# Patient Record
Sex: FEMALE | Race: WHITE
[De-identification: names, ages, dates, MRNs, and addresses within clinical notes are randomized per-mention and may not be internally consistent; named-entity substitution may affect disease eponyms.]

---

## 2018-04-10 PROBLEM — Z00.00 ENCOUNTER FOR PREVENTIVE HEALTH EXAMINATION: Status: ACTIVE | Noted: 2018-04-10

## 2018-04-16 ENCOUNTER — APPOINTMENT (OUTPATIENT)
Dept: PEDIATRIC SURGERY | Facility: CLINIC | Age: 28
End: 2018-04-16
Payer: COMMERCIAL

## 2018-04-16 DIAGNOSIS — Q89.7 MULTIPLE CONGENITAL MALFORMATIONS, NOT ELSEWHERE CLASSIFIED: ICD-10-CM

## 2018-04-16 PROCEDURE — 99243 OFF/OP CNSLTJ NEW/EST LOW 30: CPT

## 2018-05-11 ENCOUNTER — INPATIENT (INPATIENT)
Facility: HOSPITAL | Age: 28
LOS: 4 days | Discharge: HOME | End: 2018-05-16
Attending: OBSTETRICS & GYNECOLOGY | Admitting: OBSTETRICS & GYNECOLOGY
Payer: COMMERCIAL

## 2018-05-11 VITALS
DIASTOLIC BLOOD PRESSURE: 88 MMHG | TEMPERATURE: 98 F | OXYGEN SATURATION: 100 % | SYSTOLIC BLOOD PRESSURE: 128 MMHG | HEART RATE: 88 BPM | WEIGHT: 141.98 LBS | HEIGHT: 63 IN

## 2018-05-11 DIAGNOSIS — Z3A.00 WEEKS OF GESTATION OF PREGNANCY NOT SPECIFIED: ICD-10-CM

## 2018-05-11 DIAGNOSIS — O26.899 OTHER SPECIFIED PREGNANCY RELATED CONDITIONS, UNSPECIFIED TRIMESTER: ICD-10-CM

## 2018-05-11 LAB
ALBUMIN SERPL ELPH-MCNC: 3.7 G/DL — SIGNIFICANT CHANGE UP (ref 3.3–5)
ALP SERPL-CCNC: 168 U/L — HIGH (ref 40–120)
ALT FLD-CCNC: 18 U/L — SIGNIFICANT CHANGE UP (ref 10–45)
ANION GAP SERPL CALC-SCNC: 15 MMOL/L — SIGNIFICANT CHANGE UP (ref 5–17)
APPEARANCE UR: CLEAR — SIGNIFICANT CHANGE UP
AST SERPL-CCNC: 17 U/L — SIGNIFICANT CHANGE UP (ref 10–40)
BASOPHILS NFR BLD AUTO: 0.1 % — SIGNIFICANT CHANGE UP (ref 0–2)
BILIRUB SERPL-MCNC: 1 MG/DL — SIGNIFICANT CHANGE UP (ref 0.2–1.2)
BILIRUB UR-MCNC: NEGATIVE — SIGNIFICANT CHANGE UP
BLD GP AB SCN SERPL QL: POSITIVE — SIGNIFICANT CHANGE UP
BUN SERPL-MCNC: 7 MG/DL — SIGNIFICANT CHANGE UP (ref 7–23)
CALCIUM SERPL-MCNC: 9.7 MG/DL — SIGNIFICANT CHANGE UP (ref 8.4–10.5)
CHLORIDE SERPL-SCNC: 103 MMOL/L — SIGNIFICANT CHANGE UP (ref 96–108)
CO2 SERPL-SCNC: 20 MMOL/L — LOW (ref 22–31)
COLOR SPEC: YELLOW — SIGNIFICANT CHANGE UP
CREAT SERPL-MCNC: 0.4 MG/DL — LOW (ref 0.5–1.3)
DIFF PNL FLD: (no result)
EOSINOPHIL NFR BLD AUTO: 0.4 % — SIGNIFICANT CHANGE UP (ref 0–6)
GLUCOSE SERPL-MCNC: 85 MG/DL — SIGNIFICANT CHANGE UP (ref 70–99)
GLUCOSE UR QL: NEGATIVE — SIGNIFICANT CHANGE UP
HCT VFR BLD CALC: 36.7 % — SIGNIFICANT CHANGE UP (ref 34.5–45)
HGB BLD-MCNC: 12.2 G/DL — SIGNIFICANT CHANGE UP (ref 11.5–15.5)
KETONES UR-MCNC: NEGATIVE — SIGNIFICANT CHANGE UP
LDH SERPL L TO P-CCNC: 206 U/L — SIGNIFICANT CHANGE UP (ref 50–242)
LEUKOCYTE ESTERASE UR-ACNC: NEGATIVE — SIGNIFICANT CHANGE UP
LYMPHOCYTES # BLD AUTO: 12.1 % — LOW (ref 13–44)
MCHC RBC-ENTMCNC: 29.7 PG — SIGNIFICANT CHANGE UP (ref 27–34)
MCHC RBC-ENTMCNC: 33.2 G/DL — SIGNIFICANT CHANGE UP (ref 32–36)
MCV RBC AUTO: 89.3 FL — SIGNIFICANT CHANGE UP (ref 80–100)
MONOCYTES NFR BLD AUTO: 5.4 % — SIGNIFICANT CHANGE UP (ref 2–14)
NEUTROPHILS NFR BLD AUTO: 82 % — HIGH (ref 43–77)
NITRITE UR-MCNC: NEGATIVE — SIGNIFICANT CHANGE UP
PH UR: 6 — SIGNIFICANT CHANGE UP (ref 5–8)
PLATELET # BLD AUTO: 143 K/UL — LOW (ref 150–400)
POTASSIUM SERPL-MCNC: 3.8 MMOL/L — SIGNIFICANT CHANGE UP (ref 3.5–5.3)
POTASSIUM SERPL-SCNC: 3.8 MMOL/L — SIGNIFICANT CHANGE UP (ref 3.5–5.3)
PROT SERPL-MCNC: 6.9 G/DL — SIGNIFICANT CHANGE UP (ref 6–8.3)
PROT UR-MCNC: NEGATIVE MG/DL — SIGNIFICANT CHANGE UP
RBC # BLD: 4.11 M/UL — SIGNIFICANT CHANGE UP (ref 3.8–5.2)
RBC # FLD: 14.1 % — SIGNIFICANT CHANGE UP (ref 10.3–16.9)
RH IG SCN BLD-IMP: NEGATIVE — SIGNIFICANT CHANGE UP
RH IG SCN BLD-IMP: NEGATIVE — SIGNIFICANT CHANGE UP
SODIUM SERPL-SCNC: 138 MMOL/L — SIGNIFICANT CHANGE UP (ref 135–145)
SP GR SPEC: <=1.005 — SIGNIFICANT CHANGE UP (ref 1–1.03)
URATE SERPL-MCNC: 3.3 MG/DL — SIGNIFICANT CHANGE UP (ref 2.5–7)
UROBILINOGEN FLD QL: 0.2 E.U./DL — SIGNIFICANT CHANGE UP
WBC # BLD: 13.9 K/UL — HIGH (ref 3.8–10.5)
WBC # FLD AUTO: 13.9 K/UL — HIGH (ref 3.8–10.5)

## 2018-05-11 PROCEDURE — 86077 PHYS BLOOD BANK SERV XMATCH: CPT

## 2018-05-11 RX ORDER — CITRIC ACID/SODIUM CITRATE 300-500 MG
15 SOLUTION, ORAL ORAL EVERY 4 HOURS
Qty: 0 | Refills: 0 | Status: DISCONTINUED | OUTPATIENT
Start: 2018-05-11 | End: 2018-05-12

## 2018-05-11 RX ORDER — OXYTOCIN 10 UNIT/ML
333.33 VIAL (ML) INJECTION
Qty: 20 | Refills: 0 | Status: DISCONTINUED | OUTPATIENT
Start: 2018-05-11 | End: 2018-05-12

## 2018-05-11 RX ORDER — SODIUM CHLORIDE 9 MG/ML
1000 INJECTION, SOLUTION INTRAVENOUS ONCE
Qty: 0 | Refills: 0 | Status: COMPLETED | OUTPATIENT
Start: 2018-05-11 | End: 2018-05-11

## 2018-05-11 RX ORDER — SODIUM CHLORIDE 9 MG/ML
1000 INJECTION, SOLUTION INTRAVENOUS
Qty: 0 | Refills: 0 | Status: DISCONTINUED | OUTPATIENT
Start: 2018-05-11 | End: 2018-05-12

## 2018-05-11 RX ADMIN — SODIUM CHLORIDE 2000 MILLILITER(S): 9 INJECTION, SOLUTION INTRAVENOUS at 19:54

## 2018-05-12 ENCOUNTER — RESULT REVIEW (OUTPATIENT)
Age: 28
End: 2018-05-12

## 2018-05-12 ENCOUNTER — TRANSCRIPTION ENCOUNTER (OUTPATIENT)
Age: 28
End: 2018-05-12

## 2018-05-12 LAB
BLD GP AB SCN SERPL QL: NEGATIVE — SIGNIFICANT CHANGE UP
RH IG SCN BLD-IMP: NEGATIVE — SIGNIFICANT CHANGE UP
T PALLIDUM AB TITR SER: NEGATIVE — SIGNIFICANT CHANGE UP

## 2018-05-12 RX ORDER — METOCLOPRAMIDE HCL 10 MG
10 TABLET ORAL ONCE
Qty: 0 | Refills: 0 | Status: DISCONTINUED | OUTPATIENT
Start: 2018-05-12 | End: 2018-05-12

## 2018-05-12 RX ORDER — CITRIC ACID/SODIUM CITRATE 300-500 MG
30 SOLUTION, ORAL ORAL ONCE
Qty: 0 | Refills: 0 | Status: COMPLETED | OUTPATIENT
Start: 2018-05-12 | End: 2018-05-12

## 2018-05-12 RX ORDER — SIMETHICONE 80 MG/1
80 TABLET, CHEWABLE ORAL EVERY 4 HOURS
Qty: 0 | Refills: 0 | Status: DISCONTINUED | OUTPATIENT
Start: 2018-05-12 | End: 2018-05-16

## 2018-05-12 RX ORDER — IBUPROFEN 200 MG
600 TABLET ORAL EVERY 6 HOURS
Qty: 0 | Refills: 0 | Status: COMPLETED | OUTPATIENT
Start: 2018-05-12 | End: 2018-05-13

## 2018-05-12 RX ORDER — OXYTOCIN 10 UNIT/ML
333.33 VIAL (ML) INJECTION
Qty: 20 | Refills: 0 | Status: DISCONTINUED | OUTPATIENT
Start: 2018-05-12 | End: 2018-05-16

## 2018-05-12 RX ORDER — CEFAZOLIN SODIUM 1 G
2000 VIAL (EA) INJECTION ONCE
Qty: 0 | Refills: 0 | Status: DISCONTINUED | OUTPATIENT
Start: 2018-05-12 | End: 2018-05-12

## 2018-05-12 RX ORDER — HEPARIN SODIUM 5000 [USP'U]/ML
5000 INJECTION INTRAVENOUS; SUBCUTANEOUS EVERY 12 HOURS
Qty: 0 | Refills: 0 | Status: CANCELLED | OUTPATIENT
Start: 2018-05-13 | End: 2018-05-12

## 2018-05-12 RX ORDER — OXYCODONE AND ACETAMINOPHEN 5; 325 MG/1; MG/1
1 TABLET ORAL
Qty: 0 | Refills: 0 | Status: DISCONTINUED | OUTPATIENT
Start: 2018-05-12 | End: 2018-05-16

## 2018-05-12 RX ORDER — NALOXONE HYDROCHLORIDE 4 MG/.1ML
0.1 SPRAY NASAL
Qty: 0 | Refills: 0 | Status: DISCONTINUED | OUTPATIENT
Start: 2018-05-12 | End: 2018-05-12

## 2018-05-12 RX ORDER — ONDANSETRON 8 MG/1
4 TABLET, FILM COATED ORAL EVERY 6 HOURS
Qty: 0 | Refills: 0 | Status: DISCONTINUED | OUTPATIENT
Start: 2018-05-12 | End: 2018-05-16

## 2018-05-12 RX ORDER — OXYTOCIN 10 UNIT/ML
41.67 VIAL (ML) INJECTION
Qty: 20 | Refills: 0 | Status: DISCONTINUED | OUTPATIENT
Start: 2018-05-12 | End: 2018-05-12

## 2018-05-12 RX ORDER — SODIUM CHLORIDE 9 MG/ML
1000 INJECTION, SOLUTION INTRAVENOUS
Qty: 0 | Refills: 0 | Status: DISCONTINUED | OUTPATIENT
Start: 2018-05-12 | End: 2018-05-16

## 2018-05-12 RX ORDER — LANOLIN
1 OINTMENT (GRAM) TOPICAL
Qty: 0 | Refills: 0 | Status: DISCONTINUED | OUTPATIENT
Start: 2018-05-12 | End: 2018-05-16

## 2018-05-12 RX ORDER — DOCUSATE SODIUM 100 MG
100 CAPSULE ORAL
Qty: 0 | Refills: 0 | Status: DISCONTINUED | OUTPATIENT
Start: 2018-05-12 | End: 2018-05-16

## 2018-05-12 RX ORDER — DIPHENHYDRAMINE HCL 50 MG
25 CAPSULE ORAL EVERY 6 HOURS
Qty: 0 | Refills: 0 | Status: DISCONTINUED | OUTPATIENT
Start: 2018-05-12 | End: 2018-05-16

## 2018-05-12 RX ORDER — IBUPROFEN 200 MG
600 TABLET ORAL EVERY 6 HOURS
Qty: 0 | Refills: 0 | Status: DISCONTINUED | OUTPATIENT
Start: 2018-05-12 | End: 2018-05-13

## 2018-05-12 RX ORDER — OXYCODONE AND ACETAMINOPHEN 5; 325 MG/1; MG/1
2 TABLET ORAL EVERY 6 HOURS
Qty: 0 | Refills: 0 | Status: DISCONTINUED | OUTPATIENT
Start: 2018-05-12 | End: 2018-05-16

## 2018-05-12 RX ORDER — AZITHROMYCIN 500 MG/1
500 TABLET, FILM COATED ORAL ONCE
Qty: 0 | Refills: 0 | Status: COMPLETED | OUTPATIENT
Start: 2018-05-12 | End: 2018-05-12

## 2018-05-12 RX ORDER — GLYCERIN ADULT
1 SUPPOSITORY, RECTAL RECTAL AT BEDTIME
Qty: 0 | Refills: 0 | Status: DISCONTINUED | OUTPATIENT
Start: 2018-05-12 | End: 2018-05-16

## 2018-05-12 RX ORDER — SODIUM CHLORIDE 9 MG/ML
1000 INJECTION, SOLUTION INTRAVENOUS ONCE
Qty: 0 | Refills: 0 | Status: DISCONTINUED | OUTPATIENT
Start: 2018-05-12 | End: 2018-05-12

## 2018-05-12 RX ORDER — FERROUS SULFATE 325(65) MG
325 TABLET ORAL DAILY
Qty: 0 | Refills: 0 | Status: DISCONTINUED | OUTPATIENT
Start: 2018-05-12 | End: 2018-05-16

## 2018-05-12 RX ORDER — NALOXONE HYDROCHLORIDE 4 MG/.1ML
0.1 SPRAY NASAL
Qty: 0 | Refills: 0 | Status: DISCONTINUED | OUTPATIENT
Start: 2018-05-12 | End: 2018-05-16

## 2018-05-12 RX ORDER — OXYTOCIN 10 UNIT/ML
41.67 VIAL (ML) INJECTION
Qty: 20 | Refills: 0 | Status: DISCONTINUED | OUTPATIENT
Start: 2018-05-12 | End: 2018-05-16

## 2018-05-12 RX ORDER — ONDANSETRON 8 MG/1
4 TABLET, FILM COATED ORAL EVERY 6 HOURS
Qty: 0 | Refills: 0 | Status: DISCONTINUED | OUTPATIENT
Start: 2018-05-12 | End: 2018-05-12

## 2018-05-12 RX ORDER — ACETAMINOPHEN 500 MG
650 TABLET ORAL EVERY 6 HOURS
Qty: 0 | Refills: 0 | Status: DISCONTINUED | OUTPATIENT
Start: 2018-05-12 | End: 2018-05-16

## 2018-05-12 RX ORDER — TETANUS TOXOID, REDUCED DIPHTHERIA TOXOID AND ACELLULAR PERTUSSIS VACCINE, ADSORBED 5; 2.5; 8; 8; 2.5 [IU]/.5ML; [IU]/.5ML; UG/.5ML; UG/.5ML; UG/.5ML
0.5 SUSPENSION INTRAMUSCULAR ONCE
Qty: 0 | Refills: 0 | Status: DISCONTINUED | OUTPATIENT
Start: 2018-05-12 | End: 2018-05-16

## 2018-05-12 RX ORDER — MORPHINE SULFATE 50 MG/1
4 CAPSULE, EXTENDED RELEASE ORAL ONCE
Qty: 0 | Refills: 0 | Status: DISCONTINUED | OUTPATIENT
Start: 2018-05-12 | End: 2018-05-16

## 2018-05-12 RX ORDER — OXYTOCIN 10 UNIT/ML
2 VIAL (ML) INJECTION
Qty: 30 | Refills: 0 | Status: DISCONTINUED | OUTPATIENT
Start: 2018-05-12 | End: 2018-05-12

## 2018-05-12 RX ORDER — SODIUM CHLORIDE 9 MG/ML
1000 INJECTION, SOLUTION INTRAVENOUS
Qty: 0 | Refills: 0 | Status: DISCONTINUED | OUTPATIENT
Start: 2018-05-12 | End: 2018-05-12

## 2018-05-12 RX ADMIN — Medication 2 MILLIUNIT(S)/MIN: at 07:23

## 2018-05-12 RX ADMIN — SODIUM CHLORIDE 125 MILLILITER(S): 9 INJECTION, SOLUTION INTRAVENOUS at 04:25

## 2018-05-12 RX ADMIN — AZITHROMYCIN 255 MILLIGRAM(S): 500 TABLET, FILM COATED ORAL at 15:05

## 2018-05-12 RX ADMIN — Medication 600 MILLIGRAM(S): at 20:13

## 2018-05-12 RX ADMIN — Medication 600 MILLIGRAM(S): at 21:00

## 2018-05-12 RX ADMIN — Medication 100 MILLIGRAM(S): at 14:53

## 2018-05-12 RX ADMIN — Medication 125 MILLIUNIT(S)/MIN: at 17:07

## 2018-05-12 RX ADMIN — Medication 30 MILLILITER(S): at 14:55

## 2018-05-12 NOTE — DISCHARGE NOTE OB - MEDICATION SUMMARY - MEDICATIONS TO TAKE
I will START or STAY ON the medications listed below when I get home from the hospital:    ibuprofen 100 mg/5 mL oral suspension  -- 30 milliliter(s) by mouth every 6 hours, As needed, mod pain  -- Indication: For Pain    acetaminophen 325 mg oral tablet  -- 2 tab(s) by mouth every 6 hours, As needed, For Temp greater than 38.5 C (101.3 F)  -- Indication: For Fever    Prenatal Multivitamins with Folic Acid 1 mg oral tablet  -- 1 tab(s) by mouth once a day  -- Indication: For Postpartum state    docusate sodium 100 mg oral capsule  -- 1 cap(s) by mouth 2 times a day, As needed, Stool Softening  -- Indication: For Constipation

## 2018-05-12 NOTE — DISCHARGE NOTE OB - CARE PLAN
Principal Discharge DX:	Postpartum state  Goal:	discharge home  Assessment and plan of treatment:	Patient to be discharged home. nothing per vagina, no tampons, tub baths, intercourse. Patient to notify provider for fever >101, heavy vaginal bleeding, extreme abdominal pain. patient to follow up in office in 2 weeks.

## 2018-05-12 NOTE — DISCHARGE NOTE OB - CARE PROVIDER_API CALL
Darby Machuca (DO; MS), Obstetrics and Gynecology  [address] [place] [address]  Phone: [phone]  Fax: [phone]

## 2018-05-12 NOTE — DISCHARGE NOTE OB - PATIENT PORTAL LINK FT
You can access the [facility] Patient Portal, offered by [facility], by registering with the following website: [email or web address]

## 2018-05-12 NOTE — DISCHARGE NOTE OB - PLAN OF CARE
discharge home Patient to be discharged home. nothing per vagina, no tampons, tub baths, intercourse. Patient to notify provider for fever >101, heavy vaginal bleeding, extreme abdominal pain. patient to follow up in office in 2 weeks.

## 2018-05-13 LAB
BASOPHILS NFR BLD AUTO: 0.1 % — SIGNIFICANT CHANGE UP (ref 0–2)
EOSINOPHIL NFR BLD AUTO: 0.1 % — SIGNIFICANT CHANGE UP (ref 0–6)
HCT VFR BLD CALC: 30.8 % — LOW (ref 34.5–45)
HGB BLD-MCNC: 10.2 G/DL — LOW (ref 11.5–15.5)
LYMPHOCYTES # BLD AUTO: 7.8 % — LOW (ref 13–44)
MCHC RBC-ENTMCNC: 29.5 PG — SIGNIFICANT CHANGE UP (ref 27–34)
MCHC RBC-ENTMCNC: 33.1 G/DL — SIGNIFICANT CHANGE UP (ref 32–36)
MCV RBC AUTO: 89 FL — SIGNIFICANT CHANGE UP (ref 80–100)
MONOCYTES NFR BLD AUTO: 5.1 % — SIGNIFICANT CHANGE UP (ref 2–14)
NEUTROPHILS NFR BLD AUTO: 86.9 % — HIGH (ref 43–77)
PLATELET # BLD AUTO: 130 K/UL — LOW (ref 150–400)
RBC # BLD: 3.46 M/UL — LOW (ref 3.8–5.2)
RBC # FLD: 14.5 % — SIGNIFICANT CHANGE UP (ref 10.3–16.9)
WBC # BLD: 19.3 K/UL — HIGH (ref 3.8–10.5)
WBC # FLD AUTO: 19.3 K/UL — HIGH (ref 3.8–10.5)

## 2018-05-13 RX ORDER — HEPARIN SODIUM 5000 [USP'U]/ML
5000 INJECTION INTRAVENOUS; SUBCUTANEOUS EVERY 12 HOURS
Qty: 0 | Refills: 0 | Status: DISCONTINUED | OUTPATIENT
Start: 2018-05-13 | End: 2018-05-16

## 2018-05-13 RX ORDER — IBUPROFEN 200 MG
600 TABLET ORAL EVERY 6 HOURS
Qty: 0 | Refills: 0 | Status: DISCONTINUED | OUTPATIENT
Start: 2018-05-13 | End: 2018-05-16

## 2018-05-13 RX ADMIN — Medication 600 MILLIGRAM(S): at 19:39

## 2018-05-13 RX ADMIN — OXYCODONE AND ACETAMINOPHEN 2 TABLET(S): 5; 325 TABLET ORAL at 12:38

## 2018-05-13 RX ADMIN — OXYCODONE AND ACETAMINOPHEN 2 TABLET(S): 5; 325 TABLET ORAL at 12:56

## 2018-05-13 RX ADMIN — Medication 600 MILLIGRAM(S): at 20:15

## 2018-05-13 RX ADMIN — SIMETHICONE 80 MILLIGRAM(S): 80 TABLET, CHEWABLE ORAL at 21:17

## 2018-05-13 RX ADMIN — Medication 600 MILLIGRAM(S): at 01:47

## 2018-05-13 RX ADMIN — HEPARIN SODIUM 5000 UNIT(S): 5000 INJECTION INTRAVENOUS; SUBCUTANEOUS at 07:09

## 2018-05-13 RX ADMIN — Medication 600 MILLIGRAM(S): at 07:09

## 2018-05-13 RX ADMIN — SIMETHICONE 80 MILLIGRAM(S): 80 TABLET, CHEWABLE ORAL at 13:30

## 2018-05-13 RX ADMIN — HEPARIN SODIUM 5000 UNIT(S): 5000 INJECTION INTRAVENOUS; SUBCUTANEOUS at 18:33

## 2018-05-13 RX ADMIN — Medication 600 MILLIGRAM(S): at 07:58

## 2018-05-13 RX ADMIN — OXYCODONE AND ACETAMINOPHEN 2 TABLET(S): 5; 325 TABLET ORAL at 18:32

## 2018-05-13 RX ADMIN — Medication 600 MILLIGRAM(S): at 02:31

## 2018-05-13 RX ADMIN — OXYCODONE AND ACETAMINOPHEN 2 TABLET(S): 5; 325 TABLET ORAL at 19:00

## 2018-05-13 RX ADMIN — Medication 600 MILLIGRAM(S): at 14:00

## 2018-05-13 RX ADMIN — Medication 600 MILLIGRAM(S): at 13:43

## 2018-05-13 RX ADMIN — SODIUM CHLORIDE 125 MILLILITER(S): 9 INJECTION, SOLUTION INTRAVENOUS at 02:25

## 2018-05-13 RX ADMIN — SIMETHICONE 80 MILLIGRAM(S): 80 TABLET, CHEWABLE ORAL at 01:48

## 2018-05-13 NOTE — PROGRESS NOTE ADULT - ASSESSMENT
A/P 27y  s/p c/s, POD #1  ,stable  - f.u AM CBC  - TOV  Diet: Reg with flatus  Pain: OPM  IV fluid: Saline lock  OOB/SCDs/IS  Continue to monitor  Anticipate discharge POD #3 or #4

## 2018-05-13 NOTE — PROGRESS NOTE ADULT - ATTENDING COMMENTS
Patient seen and examined at bedside. patient has no current complaints. Overall feels sore, but motrin is adequate for pain control. Patient is trying to breastfeed but nipples are cracking so patient is taking a break. Has not yet passed flatus. Herman to be removed. Hemodynamically stable.

## 2018-05-13 NOTE — PROGRESS NOTE ADULT - SUBJECTIVE AND OBJECTIVE BOX
Patient evaluated at bedside.   She reports pain is well controlled with Motrin suspension  She denies headache, dizziness, chest pain, palpitations, shortness of breathe, nausea, vomiting or heavy vaginal bleeding.  She has been in bed since delivery, has Herman in situ and denies any flatus. She is tolerating clear diet and is breastfeeding.    Physical Exam:  Vital Signs Last 24 Hrs  T(C): 36.8 (13 May 2018 06:15), Max: 37.4 (12 May 2018 18:30)  T(F): 98.2 (13 May 2018 06:15), Max: 99.3 (12 May 2018 18:30)  HR: 73 (13 May 2018 06:15) (67 - 106)  BP: 110/62 (13 May 2018 06:15) (97/64 - 137/62)  BP(mean): --  RR: 18 (13 May 2018 06:15) (17 - 24)  SpO2: 98% (13 May 2018 06:15) (97% - 99%)    GA: NAD, A+0 x 3  CV: RRR  Pulm: CTAB  Breasts: soft, nontender, no palpable masses  Abd: hypo active BS, soft, nontender, mild distended, no rebound or guarding, incision clean, dry and intact, uterus firm at midline,  2 fb below umbilicus  : lochia WNL  Herman: clear urine  Extremities: no swelling or calf tenderness, reflexes +2 bilaterally                            12.2   13.9  )-----------( 143      ( 11 May 2018 18:40 )             36.7     05-11    138  |  103  |  7   ----------------------------<  85  3.8   |  20<L>  |  0.40<L>    Ca    9.7      11 May 2018 18:40    TPro  6.9  /  Alb  3.7  /  TBili  1.0  /  DBili  x   /  AST  17  /  ALT  18  /  AlkPhos  168<H>  05-11

## 2018-05-13 NOTE — LACTATION INITIAL EVALUATION - NS LACT CON REASON FOR REQ
Pt. is a P1 at 40.4 wks. gestation via .  Pt. has sore, red and cracked nipples at this time and unable to have baby at the breast.  Pt.  baby during/c/o sore, painful nipples/nipple damage/primaparous mom c/o sore, painful nipples/nipple damage/primaparous mom/Pt. is a P1 at 40.4 wks. gestation via .  Pt. has sore, red and cracked nipples at this time and unable to have baby at the breast.  Pt. last  baby  0500  but was in to much discomfort to continue breastfeeding.  Pt. has not  ,hand expressed or pumped during the day to allow  pt a "break".   While palpating breasts, pt. shows discomfort the closer I touch areola and nipple.  Attempted to do hand expression pt. tolerated briefly but then had to stop due to discomfort.  Colostrum noted while doing hand expression.  Discussed use of pump and pt. agreeable. Positioned flanges on pt.' breasts and instructed pt. on use of pump and informed pt. she can adjust the suction as need be.  Pt. able to tolerate pumping session and states she will try  to continue with pumping every three hours.  Baby was fed supplemental formula 10ml. by father.  Discussed feeding plan with pt. and states  understands to pump and if able to save EBM to be fed at next feeding session.  Discussed with pt. goal is to have baby return to breastfeeding. Pt. apprehensive but hopeful  she can return to breastfeeding.  Not known if baby has possible tongue tie or if it was a shallow latch that damaged the nipples. nipple damage/c/o sore, painful nipples/primaparous mom/Pt. is a P1 at 40.4 wks. gestation via .  Pt. has sore, red and cracked nipples at this time and unable to have baby at the breast.  Pt. last  baby  0500  but was in to much discomfort to continue breastfeeding.  Pt. has not  ,hand expressed or pumped during the day to allow  pt a "break".   While palpating breasts, pt. shows discomfort the closer I touch areola and nipple.  Attempted to do hand expression pt. tolerated briefly but then had to stop due to discomfort.  Colostrum noted while doing hand expression.  Discussed use of pump and pt. agreeable. Positioned flanges on pt.' breasts and instructed pt. on use of pump and informed pt. she can adjust the suction as need be.  Pt. able to tolerate pumping session and states she will try  to continue with pumping every three hours.  Baby was fed supplemental formula 10ml. by father.  Discussed feeding plan with pt. and states  understands to pump and if able to save EBM to be fed at next feeding session.  Discussed with pt. goal is to have baby return to breastfeeding sometime tomorrow. Pt. apprehensive but hopeful  she can return to breastfeeding.  Did not assess baby for possible tongue tie or if it was a shallow latch that damaged the nipples. nipple damage/c/o sore, painful nipples/primaparous mom/Pt. is a P1 at 40.4 wks. gestation via .  Pt. has sore, red and cracked nipples at this time and unable to have baby at the breast.  Pt. last  baby  0500  but was in to much discomfort to continue breastfeeding.  Pt. has not  ,hand expressed or pumped during the day to allow  pt a "break".   While palpating breasts, pt. shows discomfort the closer I touch areola and nipple.  Attempted to do hand expression pt. tolerated briefly but then had to stop due to discomfort.  Colostrum noted while doing hand expression.  Discussed use of pump and pt. agreeable. Positioned flanges on pt.' breasts and instructed pt. on use of pump and informed pt. she can adjust the suction as need be.  Pt. able to tolerate pumping session and states she will try  to continue with pumping every three hours.  Baby was fed supplemental formula 10ml. by father.  Discussed feeding plan with pt. and states  understands to pump and if able to save EBM to be fed at next feeding session.  Discussed with pt. goal is to have baby return to breastfeeding sometime tomorrow. Pt. apprehensive but hopeful  she can return to breastfeeding.  Unknown if  baby  has possible tongue tie or if it was a shallow latch that damaged the nipples.

## 2018-05-14 RX ADMIN — Medication 600 MILLIGRAM(S): at 01:34

## 2018-05-14 RX ADMIN — Medication 600 MILLIGRAM(S): at 23:27

## 2018-05-14 RX ADMIN — HEPARIN SODIUM 5000 UNIT(S): 5000 INJECTION INTRAVENOUS; SUBCUTANEOUS at 18:40

## 2018-05-14 RX ADMIN — OXYCODONE AND ACETAMINOPHEN 2 TABLET(S): 5; 325 TABLET ORAL at 07:08

## 2018-05-14 RX ADMIN — Medication 600 MILLIGRAM(S): at 18:15

## 2018-05-14 RX ADMIN — SIMETHICONE 80 MILLIGRAM(S): 80 TABLET, CHEWABLE ORAL at 23:29

## 2018-05-14 RX ADMIN — HEPARIN SODIUM 5000 UNIT(S): 5000 INJECTION INTRAVENOUS; SUBCUTANEOUS at 07:07

## 2018-05-14 RX ADMIN — Medication 600 MILLIGRAM(S): at 11:07

## 2018-05-14 RX ADMIN — SIMETHICONE 80 MILLIGRAM(S): 80 TABLET, CHEWABLE ORAL at 01:35

## 2018-05-14 RX ADMIN — Medication 600 MILLIGRAM(S): at 17:17

## 2018-05-14 RX ADMIN — OXYCODONE AND ACETAMINOPHEN 2 TABLET(S): 5; 325 TABLET ORAL at 19:36

## 2018-05-14 RX ADMIN — OXYCODONE AND ACETAMINOPHEN 2 TABLET(S): 5; 325 TABLET ORAL at 00:13

## 2018-05-14 RX ADMIN — OXYCODONE AND ACETAMINOPHEN 2 TABLET(S): 5; 325 TABLET ORAL at 13:34

## 2018-05-14 RX ADMIN — Medication 600 MILLIGRAM(S): at 02:22

## 2018-05-14 RX ADMIN — SIMETHICONE 80 MILLIGRAM(S): 80 TABLET, CHEWABLE ORAL at 07:07

## 2018-05-14 RX ADMIN — OXYCODONE AND ACETAMINOPHEN 2 TABLET(S): 5; 325 TABLET ORAL at 01:05

## 2018-05-14 RX ADMIN — OXYCODONE AND ACETAMINOPHEN 2 TABLET(S): 5; 325 TABLET ORAL at 18:40

## 2018-05-14 RX ADMIN — Medication 600 MILLIGRAM(S): at 12:00

## 2018-05-14 RX ADMIN — OXYCODONE AND ACETAMINOPHEN 2 TABLET(S): 5; 325 TABLET ORAL at 06:28

## 2018-05-14 RX ADMIN — OXYCODONE AND ACETAMINOPHEN 2 TABLET(S): 5; 325 TABLET ORAL at 12:43

## 2018-05-14 NOTE — PROGRESS NOTE ADULT - SUBJECTIVE AND OBJECTIVE BOX
Patient evaluated at bedside.   She reports pain is well controlled with Motrin suspension and ground up percocet. Unable to walk long distances yesterday but will attempt today.  She denies headache, dizziness, chest pain, palpitations, shortness of breathe, nausea, vomiting or heavy vaginal bleeding.  She has voided but still denies any flatus. She is tolerating clear diet and is breastfeeding.    Physical Exam:  Vital Signs Last 24 Hrs  T(C): 36.7 (13 May 2018 13:59), Max: 36.7 (13 May 2018 13:59)  T(F): 98 (13 May 2018 13:59), Max: 98 (13 May 2018 13:59)  HR: 92 (13 May 2018 13:59) (84 - 92)  BP: 130/68 (13 May 2018 13:59) (99/64 - 130/68)  BP(mean): --  RR: 18 (13 May 2018 13:59) (18 - 18)  SpO2: 98% (13 May 2018 13:59) (84% - 98%)    GA: NAD, A+0 x 3  CV: RRR  Pulm: CTAB  Breasts: soft, nontender, no palpable masses  Abd: + BS, soft, nontender, mild distension, no rebound or guarding, incision clean, dry and intact, uterus firm at midline,  2 fb below umbilicus  : lochia WNL  Extremities: no swelling or calf tenderness, reflexes +2 bilaterally                            10.2   19.3   )----------(  130       ( 13 May 2018 11:08 )               30.8                  CAPILLARY BLOOD GLUCOSE

## 2018-05-14 NOTE — PROGRESS NOTE ADULT - ASSESSMENT
A/P 27y  s/p c/s, POD #2  ,stable    Diet: Reg with flatus  Pain: OPM  IV fluid: Saline lock  OOB/SCDs/IS  Continue to monitor  Anticipate discharge POD #3 or #4

## 2018-05-15 RX ADMIN — Medication 600 MILLIGRAM(S): at 00:27

## 2018-05-15 RX ADMIN — OXYCODONE AND ACETAMINOPHEN 1 TABLET(S): 5; 325 TABLET ORAL at 02:09

## 2018-05-15 RX ADMIN — Medication 600 MILLIGRAM(S): at 06:17

## 2018-05-15 RX ADMIN — HEPARIN SODIUM 5000 UNIT(S): 5000 INJECTION INTRAVENOUS; SUBCUTANEOUS at 18:08

## 2018-05-15 RX ADMIN — Medication 600 MILLIGRAM(S): at 18:08

## 2018-05-15 RX ADMIN — Medication 100 MILLIGRAM(S): at 20:29

## 2018-05-15 RX ADMIN — HEPARIN SODIUM 5000 UNIT(S): 5000 INJECTION INTRAVENOUS; SUBCUTANEOUS at 06:16

## 2018-05-15 RX ADMIN — Medication 600 MILLIGRAM(S): at 19:00

## 2018-05-15 RX ADMIN — Medication 600 MILLIGRAM(S): at 13:00

## 2018-05-15 RX ADMIN — Medication 600 MILLIGRAM(S): at 12:07

## 2018-05-15 RX ADMIN — Medication 600 MILLIGRAM(S): at 07:17

## 2018-05-15 RX ADMIN — OXYCODONE AND ACETAMINOPHEN 1 TABLET(S): 5; 325 TABLET ORAL at 03:05

## 2018-05-15 NOTE — PROGRESS NOTE ADULT - SUBJECTIVE AND OBJECTIVE BOX
Patient evaluated at bedside.   She reports pain is well controlled with Motrin suspension and percocet.   She denies headache, dizziness, chest pain, palpitations, shortness of breathe, nausea, vomiting or heavy vaginal bleeding.  She has voided and passed flatus. She is tolerating regular diet and is breastfeeding.    Physical Exam:  Vital Signs Last 24 Hrs  T(C): 36.7 (15 May 2018 06:00), Max: 36.8 (14 May 2018 19:10)  T(F): 98.1 (15 May 2018 06:00), Max: 98.2 (14 May 2018 19:10)  HR: 72 (15 May 2018 06:00) (72 - 82)  BP: 105/68 (15 May 2018 06:00) (105/68 - 116/71)  BP(mean): --  RR: 17 (15 May 2018 06:00) (17 - 18)  SpO2: 98% (15 May 2018 06:00) (98% - 99%)    GA: NAD, A+0 x 3  CV: RRR  Pulm: CTAB  Breasts: soft, nontender, no palpable masses  Abd: + BS, soft, nontender, mild distension, no rebound or guarding, incision clean, dry and intact, uterus firm at midline,  2 fb below umbilicus  : lochia WNL  Extremities: no swelling or calf tenderness, reflexes +2 bilaterally                            10.2   19.3   )----------(  130       ( 13 May 2018 11:08 )               30.8                  CAPILLARY BLOOD GLUCOSE

## 2018-05-15 NOTE — PROGRESS NOTE ADULT - ASSESSMENT
A/P 27y  s/p c/s, POD #3  ,stable    Diet: Reg   Pain: OPM  IV fluid: Saline lock  OOB/SCDs/IS  Continue to monitor  Anticipate discharge POD #4

## 2018-05-16 VITALS
SYSTOLIC BLOOD PRESSURE: 113 MMHG | DIASTOLIC BLOOD PRESSURE: 73 MMHG | RESPIRATION RATE: 18 BRPM | HEART RATE: 71 BPM | OXYGEN SATURATION: 99 % | TEMPERATURE: 98 F

## 2018-05-16 PROCEDURE — 86880 COOMBS TEST DIRECT: CPT

## 2018-05-16 PROCEDURE — 86780 TREPONEMA PALLIDUM: CPT

## 2018-05-16 PROCEDURE — 84550 ASSAY OF BLOOD/URIC ACID: CPT

## 2018-05-16 PROCEDURE — 86870 RBC ANTIBODY IDENTIFICATION: CPT

## 2018-05-16 PROCEDURE — 85025 COMPLETE CBC W/AUTO DIFF WBC: CPT

## 2018-05-16 PROCEDURE — 83615 LACTATE (LD) (LDH) ENZYME: CPT

## 2018-05-16 PROCEDURE — 86921 COMPATIBILITY TEST INCUBATE: CPT

## 2018-05-16 PROCEDURE — 80053 COMPREHEN METABOLIC PANEL: CPT

## 2018-05-16 PROCEDURE — 36415 COLL VENOUS BLD VENIPUNCTURE: CPT

## 2018-05-16 PROCEDURE — 88307 TISSUE EXAM BY PATHOLOGIST: CPT

## 2018-05-16 PROCEDURE — 86922 COMPATIBILITY TEST ANTIGLOB: CPT

## 2018-05-16 PROCEDURE — 86850 RBC ANTIBODY SCREEN: CPT

## 2018-05-16 PROCEDURE — 86901 BLOOD TYPING SEROLOGIC RH(D): CPT

## 2018-05-16 PROCEDURE — 81001 URINALYSIS AUTO W/SCOPE: CPT

## 2018-05-16 PROCEDURE — 99214 OFFICE O/P EST MOD 30 MIN: CPT

## 2018-05-16 PROCEDURE — 86900 BLOOD TYPING SEROLOGIC ABO: CPT

## 2018-05-16 RX ORDER — DOCUSATE SODIUM 100 MG
1 CAPSULE ORAL
Qty: 0 | Refills: 0 | COMMUNITY
Start: 2018-05-16

## 2018-05-16 RX ORDER — ACETAMINOPHEN 500 MG
2 TABLET ORAL
Qty: 0 | Refills: 0 | COMMUNITY
Start: 2018-05-16

## 2018-05-16 RX ORDER — IBUPROFEN 200 MG
30 TABLET ORAL
Qty: 0 | Refills: 0 | COMMUNITY
Start: 2018-05-16

## 2018-05-16 RX ADMIN — Medication 600 MILLIGRAM(S): at 07:15

## 2018-05-16 RX ADMIN — Medication 600 MILLIGRAM(S): at 06:15

## 2018-05-16 RX ADMIN — Medication 600 MILLIGRAM(S): at 00:18

## 2018-05-16 RX ADMIN — Medication 600 MILLIGRAM(S): at 12:09

## 2018-05-16 RX ADMIN — Medication 600 MILLIGRAM(S): at 01:18

## 2018-05-16 RX ADMIN — Medication 600 MILLIGRAM(S): at 13:00

## 2018-05-16 RX ADMIN — HEPARIN SODIUM 5000 UNIT(S): 5000 INJECTION INTRAVENOUS; SUBCUTANEOUS at 06:16

## 2018-05-16 NOTE — PROGRESS NOTE ADULT - ASSESSMENT
A/P  yo 27y P1 s/p c/s, POD #4  ,stable  1. Pain: po pain meds  2. GI: regular diet  3. : voiding freely  4. DVT prophylaxis: SQH  5. Dispo: discharge home today

## 2018-05-16 NOTE — PROGRESS NOTE ADULT - SUBJECTIVE AND OBJECTIVE BOX
Patient evaluated at bedside.   She reports pain is well controlled with po pain meds  She denies headache, dizziness, chest pain, palpitations, shortness of breathe, nausea, vomiting or heavy vaginal bleeding.  She has been ambulating without assistance, voiding spontaneously, passing gas, tolerating regular diet and is breastfeeding.    Physical Exam:  Vital Signs Last 24 Hrs  T(C): 36.7 (16 May 2018 06:01), Max: 36.8 (15 May 2018 21:13)  T(F): 98 (16 May 2018 06:01), Max: 98.3 (15 May 2018 21:13)  HR: 70 (16 May 2018 06:01) (70 - 88)  BP: 124/84 (16 May 2018 06:01) (98/65 - 124/84)  BP(mean): --  RR: 18 (16 May 2018 06:01) (18 - 18)  SpO2: 99% (16 May 2018 06:01) (98% - 99%)    GA: NAD, A+0 x 3  CV: RRR  Pulm: CTAB  Breasts: soft, nontender, no palpable masses  Abd: + BS, soft, nontender, nondistended, no rebound or guarding, incision clean, dry and intact, uterus firm at midline, 2 fb below umbilicus  : lochia WNL  Extremities: no swelling or calf tenderness, reflexes +2 bilaterally

## 2018-05-20 DIAGNOSIS — O32.4XX0 MATERNAL CARE FOR HIGH HEAD AT TERM, NOT APPLICABLE OR UNSPECIFIED: ICD-10-CM

## 2018-05-20 DIAGNOSIS — Z3A.40 40 WEEKS GESTATION OF PREGNANCY: ICD-10-CM

## 2018-05-20 DIAGNOSIS — M79.662 PAIN IN LEFT LOWER LEG: ICD-10-CM

## 2018-05-20 DIAGNOSIS — O99.89 OTHER SPECIFIED DISEASES AND CONDITIONS COMPLICATING PREGNANCY, CHILDBIRTH AND THE PUERPERIUM: ICD-10-CM

## 2018-05-22 LAB — SURGICAL PATHOLOGY STUDY: SIGNIFICANT CHANGE UP

## 2018-08-24 NOTE — PATIENT PROFILE OB - PURPOSEFUL PROACTIVE ROUNDING
Jamila Velasquez comes into clinic today at the request of AMANDA Huerta, PM\[facility]\""-BC FELIPE MORALEZ     Patient

## 2023-04-07 NOTE — PATIENT PROFILE OB - HARM RISK FACTORS
Plan: This patient has been treated today with image-guided superficial radiation therapy for non-melanoma skin cancer. Written informed consent has been previously obtained from this patient for this treatment. This consent is documented in the patient’s chart. The patient gave verbal consent to continue treatment today. The patient was treated with a specific radiation dose and setup as prescribed by the provider listed on this visit note. A Radiation Therapist performed administration of radiation under the supervision of a provider. The treatment parameters and cumulative dose are indicated above. Prior to administering the radiation, the patient underwent a verification therapeutic radiology simulation-aided field setting defining relevant normal and abnormal target anatomy in addition to data necessary to develop an optimal radiation treatment process for the patient. The field placement simulation documents any change seen in overall tumor volume documented in the patient’s record, allows the clinician to indicate any needed changes in the treatment plan and/or prescription, provides diagnostic evaluation as the basis for performing the therapeutic procedure, and clearly identifies the information needed to decide to proceed with the therapeutic procedure. This process includes verification of the treatment port(s) and proper treatment positioning. All treatment ports were photographed within electronic medical records. The patient’s lead blocking along with gross tumor volume and margin was confirmed. Considering superficial radiotherapy is clinical in setup, this requires the physician and radiation therapist to clarify the location interest being treated against initial images, pathology, and patient anatomy. Care was taken to ensure borrego treated were geometrically accurate and properly positioned using therapeutic radiology simulation-aided field setting verification per fraction. This process is also utilized to determine if any prescription or setup changes are necessary. These steps are therefore medically necessary to ensure safe and effective administration of radiation. Ongoing therapeutic radiology simulation-aided field setting verification is ordered throughout the course of therapy. Dr. Arreguin continued therapeutic radiology simulation-aided field setting verification per fraction is required for field placement. Per Dr. rAreguin continued ultrasound guidance will continue on OTV treatment days, and 2 follow up visits, which is required for, measurement of tumor depth, width, breadth, tumor progress, whether to proceed with therapeutic delivery, and acute effect monitoring. Plan: This patient has been treated today with image-guided superficial radiation therapy for non-melanoma skin cancer. Written informed consent has been previously obtained from this patient for this treatment. This consent is documented in the patient’s chart. The patient gave verbal consent to continue treatment today. The patient was treated with a specific radiation dose and setup as prescribed by the provider listed on this visit note. A Radiation Therapist performed administration of radiation under the supervision of a provider. The treatment parameters and cumulative dose are indicated above. Prior to administering the radiation, the patient underwent a verification therapeutic radiology simulation-aided field setting defining relevant normal and abnormal target anatomy in addition to data necessary to develop an optimal radiation treatment process for the patient. The field placement simulation documents any change seen in overall tumor volume documented in the patient’s record, allows the clinician to indicate any needed changes in the treatment plan and/or prescription, provides diagnostic evaluation as the basis for performing the therapeutic procedure, and clearly identifies the information needed to decide to proceed with the therapeutic procedure. This process includes verification of the treatment port(s) and proper treatment positioning. All treatment ports were photographed within electronic medical records. The patient’s lead blocking along with gross tumor volume and margin was confirmed. Considering superficial radiotherapy is clinical in setup, this requires the physician and radiation therapist to clarify the location interest being treated against initial images, pathology, and patient anatomy. Care was taken to ensure borrego treated were geometrically accurate and properly positioned using therapeutic radiology simulation-aided field setting verification per fraction. This process is also utilized to determine if any prescription or setup changes are necessary. These steps are therefore medically necessary to ensure safe and effective administration of radiation. Ongoing therapeutic radiology simulation-aided field setting verification is ordered throughout the course of therapy. Dr. Arreguin continued therapeutic radiology simulation-aided field setting verification per fraction is required for field placement. Per Dr. Arreguin continued ultrasound guidance will continue on OTV treatment days, and 2 follow up visits, which is required for, measurement of tumor depth, width, breadth, tumor progress, whether to proceed with therapeutic delivery, and acute effect monitoring. no

## 2024-10-15 NOTE — PROGRESS NOTE ADULT - SUBJECTIVE AND OBJECTIVE BOX
Medical Necessity Information: It is in your best interest to select a reason for this procedure from the list below. All of these items fulfill various CMS LCD requirements except the new and changing color options. Evaluated patient at bedside, as was notified by nursing that patient was complaining of lower leg pain. On evaluation patient stated pain had resolved. Stated pain was on the anterior portion fo left lower leg, above bony portion. Denies any swelling, warmth, or redness. States that she is walking without difficulty and has normal range of motion. The ultrasound team came to transport patient for ultrasound but patient refused and requesting bedside sonogram, which was ordered, awaiting portable sonogram. On physical exam no signs of DVT as non tender, no swelling, no erythema, and no warmth. F/u dopplers. Medical Necessity Clause: This procedure was medically necessary because the lesion that was treated was: Lab: [id number] Lab Facility: 0 Body Location Override (Optional - Billing Will Still Be Based On Selected Body Map Location If Applicable): left posterior shoulder Detail Level: Detailed Was A Bandage Applied: Yes Size Of Lesion In Cm (Required): 0.3 Depth Of Shave: dermis Biopsy Method: Dermablade Anesthesia Type: 1% lidocaine with epinephrine Anesthesia Volume In Cc: 0.5 Hemostasis: Electrocautery Wound Care: Petrolatum Render Path Notes In Note?: No Consent was obtained from the patient. The risks and benefits to therapy were discussed in detail. Specifically, the risks of infection, scarring, bleeding, prolonged wound healing, incomplete removal, allergy to anesthesia, nerve injury and recurrence were addressed. Prior to the procedure, the treatment site was clearly identified and confirmed by the patient. All components of Universal Protocol/PAUSE Rule completed. Post-Care Instructions: I reviewed with the patient in detail post-care instructions. Patient is to keep the biopsy site dry overnight, and then apply bacitracin twice daily until healed. Patient may apply hydrogen peroxide soaks to remove any crusting. Notification Instructions: Patient will be notified of pathology results. However, patient instructed to call the office if not contacted within 2 weeks. Billing Type: Third-Party Bill Body Location Override (Optional - Billing Will Still Be Based On Selected Body Map Location If Applicable): left inframmatory Size Of Lesion In Cm (Required): 0.2 Body Location Override (Optional - Billing Will Still Be Based On Selected Body Map Location If Applicable): left abdomen